# Patient Record
Sex: FEMALE | Race: WHITE
[De-identification: names, ages, dates, MRNs, and addresses within clinical notes are randomized per-mention and may not be internally consistent; named-entity substitution may affect disease eponyms.]

---

## 2017-04-19 NOTE — ER DOCUMENT REPORT
ED General





- General


Stated Complaint: POSSIBLE SEIZURE


Mode of Arrival: Ambulatory


Information source: Patient


Notes: 


66 yr old female hx of hypomagnesemia secondary to absorption issues presents 

with seizure disorder presents with complaints of 2 episodes of seizure 

activity one was 30 seconds second lasting 15 seconds.  Patient only has these 

seizures when her magnesium levels low, patient receives magnesium transfusions 

3 times a week.  Receiving her transfusion when she seized


TRAVEL OUTSIDE OF THE [place] IN LAST 30 DAYS: No





- HPI


Onset: Just prior to arrival


Onset/Duration: Sudden


Quality of pain: No pain


Severity: Mild


Pain Level: Denies


Associated symptoms: Weakness, Other


Exacerbated by: Denies


Relieved by: Denies


Similar symptoms previously: Yes


Recently seen / treated by doctor: Yes





- Related Data


Allergies/Adverse Reactions: 


 





acetaminophen [From Percocet] Allergy (Verified 04/19/17 09:25)


 


butorphanol [From Stadol] Allergy (Verified 04/19/17 09:25)


 


cephalexin [From Keflex] Allergy (Verified 04/19/17 09:25)


 


codeine Allergy (Verified 04/19/17 09:25)


 Difficulty breathing


cyclobenzaprine [From Flexeril] Allergy (Verified 04/19/17 09:25)


 


ketoprofen Allergy (Verified 04/19/17 09:25)


 


ketorolac [From Toradol] Allergy (Verified 04/19/17 09:25)


 


meloxicam [From Mobic] Allergy (Verified 04/19/17 09:25)


 


oxycodone [From Percocet] Allergy (Verified 04/19/17 09:25)


 


pentazocine [From Talwin] Allergy (Verified 04/19/17 09:25)


 


promethazine [From Phenergan] Allergy (Verified 04/19/17 09:25)


 


sulfasalazine [From Azulfidine] Allergy (Verified 04/19/17 09:25)


 


adhesive Adverse Reaction (Verified 04/19/17 09:25)


 











Past Medical History





- Social History


Smoking Status: Never Smoker


Cigarette use (# per day): No


Chew tobacco use (# tins/day): No


Smoking Education Provided: No


Family History: Reviewed & Not Pertinent





Review of Systems





- Review of Systems


Notes: 


REVIEW OF SYSTEMS:


CONSTITUTIONAL :  Denies fever,  chills, or sweats.  Denies recent illness.


EENT:   Denies eye, ear, throat, or mouth pain or symptoms.  Denies nasal or 

sinus congestion or discharge.  Denies throat, tongue, or mouth swelling or 

difficulty swallowing.


CARDIOVASCULAR:  Denies chest pain.  Denies palpitations or racing or irregular 

heart beat.  Denies ankle edema.


RESPIRATORY:  Denies cough, cold, or chest congestion.  Denies shortness of 

breath, difficulty breathing, or wheezing.


GASTROINTESTINAL:  Denies abdominal pain or distention.  Denies nausea, vomiting

, or diarrhea.  Denies blood in vomitus, stools, or per rectum.  Denies black, 

tarry stools.  Denies constipation. 


GENITOURINARY:  Denies difficulty urinating, painful urination, burning, 

frequency, blood in urine, or discharge.


FEMALE  GENITOURINARY:  Denies vaginal bleeding, heavy or abnormal periods, 

irregular periods.  Denies vaginal discharge or odor. 


MUSCULOSKELETAL:  Denies back or neck pain or stiffness.  Denies joint pain or 

swelling.


SKIN:   Denies rash, lesions or sores.


HEMATOLOGIC :   Denies easy bruising or bleeding.


LYMPHATIC:  Denies swollen, enlarged glands.


NEUROLOGICAL: Admits to seizure


PSYCHIATRIC:  Denies anxiety or stress.  Denies depression, suicidal ideation, 

or homicidal ideation.





ALL OTHER SYSTEMS REVIEWED AND NEGATIVE.








Dictation was performed using Dragon voice recognition software 











PHYSICAL EXAMINATION:





GENERAL: Well-appearing, well-nourished and in no acute distress.





HEAD: Atraumatic, normocephalic.





EYES: Pupils equal round and reactive to light, extraocular movements intact, 

conjunctiva are normal.





ENT: Nares patent, oropharynx clear without exudates.  Moist mucous membranes.





NECK: Normal range of motion, supple without lymphadenopathy





LUNGS: Breath sounds clear to auscultation bilaterally and equal.  No wheezes 

rales or rhonchi.





HEART: Regular rate and rhythm without murmurs





ABDOMEN: Soft, nontender, nondistended abdomen.  No guarding, no rebound.  No 

masses appreciated.





Female : deferred





Musculoskeletal: Normal range of motion, no pitting or edema.  No cyanosis.





NEUROLOGICAL: Cranial nerves grossly intact.  Normal speech, normal gait.  

Normal sensory, motor exams





PSYCH: Normal mood, normal affect.





SKIN: Warm, Dry, normal turgor, no rashes or lesions noted.





Physical Exam





- Vital signs


Vitals: 


 











Resp


 


 16 


 


 04/19/17 08:55














Course





- Re-evaluation


Re-evalutation: 


04/19/17 09:07


Patient is currently being infused magnesium, given that this is the cause of 

her previous seizures and I expect any life-threatening issues





04/19/17 11:31


Magnesium level is noted to be significantly low, she is still being transfused 

with no difficulty, mild hypotension is no secondary to the infusion, fluid 

bolus given otherwise patient stable well-appearing





04/19/17 14:10


dilantin level is very low, will give a large dose here and have patient follow 

up with neuroogy for further care 








After performing a Medical Screening Examination, I estimate there is LOW risk 

for INTRACRANIAL HEMORRHAGE, ISCHEMIC CVA, MALIGNANT DYSRHYTHMIA, ACUTE 

CORONARY SYNDROME, MENINGITIS, PULMONARY EMBOLISM, or SEPSIS thus I consider 

the discharge disposition reasonable.  I have reevaluated this patient multiple 

times and no significant life threatening changes are noted. The patient and I 

have discussed the diagnosis and risks, and we agree with discharging home with 

close follow-up with the understanding that symptoms and presentations can 

change. We also discussed returning to the Emergency Department immediately if 

new or worsening symptoms occur. We have discussed the symptoms which are most 

concerning (e.g., changing or worsening pain, weakness, vomiting, fever) that 

necessitate immediate return.





- Vital Signs


Vital signs: 


 











Temp Pulse Resp BP Pulse Ox


 


 98.1 F      11 L  110/64   96 


 


 04/19/17 13:01     04/19/17 13:01  04/19/17 13:01  04/19/17 13:01














- Laboratory


Result Diagrams: 


 04/19/17 09:17





 04/19/17 09:17


Laboratory results interpreted by me: 


 











  04/19/17 04/19/17 04/19/17





  09:17 09:17 11:34


 


RBC  3.17 L  


 


Hgb  9.5 L  


 


Hct  28.7 L  


 


RDW  14.9 H  


 


Chloride   113 H 


 


Carbon Dioxide   18 L 


 


BUN   21 H 


 


Creatinine   1.78 H 


 


Est GFR ( Amer)   35 L 


 


Est GFR (Non-Af Amer)   29 L 


 


Calcium   8.0 L 


 


Magnesium   0.9 L* 


 


Total Protein   5.2 L 


 


Albumin   3.0 L 


 


Phenytoin    < 3.0 L














Critical Care Note





- Critical Care Note


Total time excluding time spent on procedures (mins): 34


Comments: 


34  minutes of critical care time spent in direct contact evaluating and 

reevaluating the patient, treating symptoms, reviewing labs and studies and 

speaking with family  and consultants excluding any procedures





Discharge





- Discharge


Clinical Impression: 


 Seizure disorder, Hypomagnesemia syndrome





Condition: Stable


Disposition: HOME, SELF-CARE


Additional Instructions: 


Seizure, Known Epileptic





     You have had a seizure.  Seizures may "break through" in an epileptic due 

to stress of infection or injury, a change in blood chemistry, or drug and 

alcohol use.  Another common cause is failure to take medication as prescribed.


     Your doctor has evaluated your situation for the likely cause of this 

seizure.  It is important that you follow his advice concerning any medication 

changes and follow-up care.  Further testing of anti-seizure medication levels 

in your blood may be necessary.


     If you have a 's license, it's important that you DO NOT DRIVE until 

given permission by your physician.  This seizure must be reported to the 

's license bureau.


     Call the doctor or return if seizures recur, or if new or unusual symptoms 

arise -- such as severe headache, confusion, excessive sleepiness, local 

weakness or numbness, neck stiffness, or fever.





Referrals: 


GONZALEZ DEE MD [ACTIVE STAFF] - Follow up tomorrow

## 2017-05-14 NOTE — ER DOCUMENT REPORT
ED Medical Screen (RME)





- General


Chief Complaint: Blurred Vision


Stated Complaint: NECK PAIN/SLURRED SPEECH


TRAVEL OUTSIDE OF THE [place] IN LAST 30 DAYS: No





- HPI


Notes: 





05/14/17 17:11


Patient coming in for evaluation of slurred speech blurry vision neck pain 

ongoing for the last 2 days





- Related Data


Allergies/Adverse Reactions: 


 





butorphanol [From Stadol] Allergy (Verified 05/14/17 17:06)


 


cephalexin [From Keflex] Allergy (Verified 05/14/17 17:06)


 


codeine Allergy (Verified 05/14/17 17:06)


 Difficulty breathing


cyclobenzaprine [From Flexeril] Allergy (Verified 05/14/17 17:06)


 


ketoprofen Allergy (Verified 05/14/17 17:06)


 


ketorolac [From Toradol] Allergy (Verified 05/14/17 17:06)


 


meloxicam [From Mobic] Allergy (Verified 05/14/17 17:06)


 


oxycodone [From Percocet] Allergy (Verified 05/14/17 17:06)


 


pentazocine [From Talwin] Allergy (Verified 05/14/17 17:06)


 


promethazine [From Phenergan] Allergy (Verified 05/14/17 17:06)


 


sulfasalazine [From Azulfidine] Allergy (Verified 05/14/17 17:06)


 


adhesive Adverse Reaction (Verified 05/14/17 17:06)


 











Past Medical History


Neurological Medical History: Reports: Hx Seizures


Renal/ Medical History: Reports: Hx Kidney Stones.  Denies: Hx Peritoneal 

Dialysis


Musculoskeltal Medical History: Reports Hx Arthritis


Psychiatric Medical History: Reports: Hx Depression





- Immunizations


Hx Diphtheria, Pertussis, Tetanus Vaccination: No





Review of Systems





- Review of Systems


Constitutional: Other - Neck pain blurry vision neck pain





Physical Exam





- Vital signs


Vitals: 





 











Temp Pulse Resp BP Pulse Ox


 


 97.5 F   88   16   107/57 L  97 


 


 05/14/17 17:02  05/14/17 17:02  05/14/17 17:02  05/14/17 17:02  05/14/17 17:02














- Notes


Notes: 


Equal  strength bilaterally symmetrical smile.  Pupils PERRLA





Course





- Re-evaluation


Re-evalutation: 





05/14/17 17:13


I have greeted and performed a rapid initial assessment of this patient.  A 

comprehensive ED assessment and evaluation of the patient, analysis of test 

results and completion of the medical decision making process will be conducted 

by additional ED providers.





- Vital Signs


Vital signs: 





 











Temp Pulse Resp BP Pulse Ox


 


 97.5 F   88   16   107/57 L  97 


 


 05/14/17 17:02  05/14/17 17:02  05/14/17 17:02  05/14/17 17:02  05/14/17 17:02

## 2017-05-14 NOTE — ER DOCUMENT REPORT
ED General





- General


Chief Complaint: Blurred Vision


Stated Complaint: NECK PAIN/SLURRED SPEECH


Time Seen by Provider: 05/14/17 17:13


Notes: 


Patient is a 66-year-old female with a past medical history of seizures 

secondary to hypomagnesemia, hypertension, hyperlipidemia who presents with 

intermittent double vision, left-sided neck pain, and generalized fatigue.  

States the symptoms have been intermittent over the last 2 days and unchanged 

in frequency since onset.  Nothing improves or worsens her symptoms.  She is 

uncertain if she's had similar symptoms in the past.  Admits to poor by mouth 

intake over the last several days.  She has not seen her primary care doctor 

regarding today's concerns.  She denies any focal weakness, numbness, altered 

mental status, nausea, vomiting, diarrhea, chest pain.  Patient's daughter is 

also concerned about slurred speech but the patient states clearly "it's 

because I'm not wearing dentures silly".


TRAVEL OUTSIDE OF THE [place] IN LAST 30 DAYS: No





- Related Data


Allergies/Adverse Reactions: 


 





butorphanol [From Stadol] Allergy (Verified 05/14/17 17:06)


 


cephalexin [From Keflex] Allergy (Verified 05/14/17 17:06)


 


codeine Allergy (Verified 05/14/17 17:06)


 Difficulty breathing


cyclobenzaprine [From Flexeril] Allergy (Verified 05/14/17 17:06)


 


ketoprofen Allergy (Verified 05/14/17 17:06)


 


ketorolac [From Toradol] Allergy (Verified 05/14/17 17:06)


 


meloxicam [From Mobic] Allergy (Verified 05/14/17 17:06)


 


oxycodone [From Percocet] Allergy (Verified 05/14/17 17:06)


 


pentazocine [From Talwin] Allergy (Verified 05/14/17 17:06)


 


promethazine [From Phenergan] Allergy (Verified 05/14/17 17:06)


 


sulfasalazine [From Azulfidine] Allergy (Verified 05/14/17 17:06)


 


adhesive Adverse Reaction (Verified 05/14/17 17:06)


 











Past Medical History





- General


Information source: Patient, Relative





- Social History


Smoking Status: Never Smoker


Frequency of alcohol use: None


Drug Abuse: None


Lives with: Family


Family History: Reviewed & Not Pertinent


Neurological Medical History: Reports: Hx Seizures


Renal/ Medical History: Reports: Hx Kidney Stones.  Denies: Hx Peritoneal 

Dialysis


Musculoskeltal Medical History: Reports Hx Arthritis


Psychiatric Medical History: Reports: Hx Depression





- Immunizations


Hx Diphtheria, Pertussis, Tetanus Vaccination: No





Review of Systems





- Review of Systems


Notes: 


Constitutional: Negative for fever.


HENT: Negative for sore throat.


Eyes: Positive for visual changes.


Cardiovascular: Negative for chest pain.


Respiratory: Negative for shortness of breath.


Gastrointestinal: Negative for abdominal pain, vomiting or diarrhea.


Genitourinary: Negative for dysuria.


Musculoskeletal: Negative for back pain.


Skin: Negative for rash.


Neurological: Negative for headaches, weakness or numbness.





10 point ROS negative except as marked above and in HPI.





Physical Exam





- Vital signs


Vitals: 


 











Temp Pulse Resp BP Pulse Ox


 


 97.5 F   88   16   107/57 L  97 


 


 05/14/17 17:02  05/14/17 17:02  05/14/17 17:02  05/14/17 17:02  05/14/17 17:02











Interpretation: Normal


Notes: 


PHYSICAL EXAMINATION:





GENERAL: Well-appearing, well-nourished and in no acute distress.





HEAD: Atraumatic, normocephalic.





EYES: Pupils equal round and reactive to light, extraocular movements intact, 

sclera anicteric, conjunctiva are normal.





ENT: nares patent, oropharynx clear without exudates.  Moist mucous membranes.





NECK: Normal range of motion, supple without lymphadenopathy





LUNGS: Breath sounds clear to auscultation bilaterally and equal.  No wheezes 

rales or rhonchi.





HEART: Regular rate and rhythm without murmurs





ABDOMEN: Soft, nontender, normoactive bowel sounds.  No guarding, no rebound.  

No masses appreciated.





EXTREMITIES: Normal range of motion, no pitting or edema.  No cyanosis.





NEUROLOGICAL: Face symmetric.  Tongue protrudes midline.  Extraocular motions 

intact.  Pupils are 2 mm and equally reactive.  Normal speech, normal gait.  5 

out of 5 strength in both the distal and proximal upper and lower extremities 

bilaterally.  Sensation is grossly intact throughout.  Finger to nose testing 

normal.  Pronator drift normal.





PSYCH: Normal mood, normal affect.





SKIN: Warm, Dry, normal turgor, no rashes or lesions noted.





Course





- Re-evaluation


Re-evalutation: 


05/14/17 18:27





Patient presents with multiple vague complaints that did not appear to be 

concerning for any acute life-threatening pathology.  Vitals are within normal 

limits at triage and at time of discharge.  Physical examination is 

unremarkable.  Patient is continuing intermittent "double vision" but has no 

field cut on exam, full extraocular motions.  A complete neuro assessment is 

unremarkable without any focal neurologic deficit, cerebellar deficits, or 

difficulty with ambulation.  She has negative Romberg and pronator drift.  

Speech is clear.  She also is complaining of pain over the left 

sternocleidomastoid muscle and there is point tenderness to palpation.  In 

triage, CT the head and a x-ray of the cervical spine were ordered which were 

noted to be normal.  Patient has tolerated oral intake without difficulty.  

Patient was not noted to be in distress at any point during their ER visit.  At 

this time, based on the reassuring evaluation, I do not suspect an acute MI, 

pulmonary embolus, stroke, aortic dissection, acute intra-abdominal pathology, 

stroke, or sepsis.her labs are consistent with dehydration as she has worsening 

chronic kidney function today relative to her baseline.  I have informed 

patient that she will need to have this rechecked.  So informed them that her 

phenytoin level is undetectable and that she will need to follow closely with 

her primary doctor regarding this issue.  Will discharge with return 

precautions and follow-up recommendations.  Verbal discharge instructions given 

a the bedside and opportunity for questions given. Medication warnings 

reviewed. Patient is in agreement with this plan and has verbalized 

understanding of return precautions and the need for primary care follow-up in 

the next 24-72 hours.











- Vital Signs


Vital signs: 


 











Temp Pulse Resp BP Pulse Ox


 


 97.5 F   88   16   107/57 L  97 


 


 05/14/17 17:02  05/14/17 17:02  05/14/17 17:02  05/14/17 17:02  05/14/17 17:02














- Laboratory


Result Diagrams: 


 05/14/17 19:08





 05/14/17 19:08


Laboratory results interpreted by me: 


 











  05/14/17 05/14/17 05/14/17





  19:08 19:08 19:08


 


RBC  3.08 L  


 


Hgb  9.2 L  


 


Hct  27.3 L  


 


RDW  14.6 H  


 


Sodium   135.4 L 


 


Chloride   108 H 


 


BUN   30 H 


 


Creatinine   2.30 H 


 


Est GFR ( Amer)   26 L 


 


Est GFR (Non-Af Amer)   21 L 


 


Calcium   8.3 L 


 


Alkaline Phosphatase   139 H 


 


Total Protein   4.8 L 


 


Albumin   2.5 L 


 


Phenytoin    < 3.0 L














Discharge





- Discharge


Clinical Impression: 


 Dehydration, Lightheadedness





Condition: Good


Disposition: HOME, SELF-CARE


Additional Instructions: 


Please be sure to drink plenty of fluids.  Your magnesium level is normal 

today.  The CT scan of her head was also normal.  Your kidney function is worse 

today and he needn't have this rechecked by her primary care doctor in the next 

1 week. Please return to the emergency room immediately if you experience any 

concerning symptoms including high fevers, severe headache, chest pain, 

difficulty breathing, abdominal pain, slurred speech, numbness or weakness in 

your arms or legs, or any other symptom that concerns you.


Referrals: 


JAIMIE HERRERA MD [Primary Care Provider] - Follow up in 3-5 days

## 2020-07-24 ENCOUNTER — HOSPITAL ENCOUNTER (OUTPATIENT)
Dept: HOSPITAL 62 - II | Age: 70
Discharge: HOME | End: 2020-07-24
Attending: INTERNAL MEDICINE
Payer: MEDICARE

## 2020-07-24 VITALS — SYSTOLIC BLOOD PRESSURE: 98 MMHG | DIASTOLIC BLOOD PRESSURE: 57 MMHG

## 2020-07-24 DIAGNOSIS — K50.90: ICD-10-CM

## 2020-07-24 DIAGNOSIS — E83.42: ICD-10-CM

## 2020-07-24 DIAGNOSIS — K90.9: Primary | ICD-10-CM

## 2020-07-24 PROCEDURE — 96375 TX/PRO/DX INJ NEW DRUG ADDON: CPT

## 2020-07-24 PROCEDURE — 96365 THER/PROPH/DIAG IV INF INIT: CPT

## 2020-07-24 PROCEDURE — 96366 THER/PROPH/DIAG IV INF ADDON: CPT

## 2020-07-24 RX ADMIN — MAGNESIUM SULFATE IN DEXTROSE SCH MLS/HR: 10 INJECTION, SOLUTION INTRAVENOUS at 13:12

## 2020-07-24 RX ADMIN — MAGNESIUM SULFATE IN DEXTROSE SCH MLS/HR: 10 INJECTION, SOLUTION INTRAVENOUS at 12:12

## 2020-07-28 ENCOUNTER — HOSPITAL ENCOUNTER (OUTPATIENT)
Dept: HOSPITAL 62 - II | Age: 70
Discharge: HOME | End: 2020-07-28
Attending: INTERNAL MEDICINE
Payer: MEDICARE

## 2020-07-28 VITALS — DIASTOLIC BLOOD PRESSURE: 59 MMHG | SYSTOLIC BLOOD PRESSURE: 114 MMHG

## 2020-07-28 DIAGNOSIS — K90.9: Primary | ICD-10-CM

## 2020-07-28 DIAGNOSIS — E83.42: ICD-10-CM

## 2020-07-28 DIAGNOSIS — K50.90: ICD-10-CM

## 2020-07-28 PROCEDURE — 96375 TX/PRO/DX INJ NEW DRUG ADDON: CPT

## 2020-07-28 PROCEDURE — 96366 THER/PROPH/DIAG IV INF ADDON: CPT

## 2020-07-28 PROCEDURE — 96365 THER/PROPH/DIAG IV INF INIT: CPT

## 2020-07-28 RX ADMIN — MAGNESIUM SULFATE IN DEXTROSE SCH MLS/HR: 10 INJECTION, SOLUTION INTRAVENOUS at 08:44

## 2020-07-28 RX ADMIN — MAGNESIUM SULFATE IN DEXTROSE SCH MLS/HR: 10 INJECTION, SOLUTION INTRAVENOUS at 09:55

## 2020-11-19 ENCOUNTER — HOSPITAL ENCOUNTER (OUTPATIENT)
Dept: HOSPITAL 62 - II | Age: 70
Discharge: HOME | End: 2020-11-19
Attending: INTERNAL MEDICINE
Payer: MEDICARE

## 2020-11-19 VITALS — DIASTOLIC BLOOD PRESSURE: 53 MMHG | SYSTOLIC BLOOD PRESSURE: 103 MMHG

## 2020-11-19 DIAGNOSIS — E83.42: ICD-10-CM

## 2020-11-19 DIAGNOSIS — K90.9: Primary | ICD-10-CM

## 2020-11-19 DIAGNOSIS — Z87.19: ICD-10-CM

## 2020-11-19 PROCEDURE — 96365 THER/PROPH/DIAG IV INF INIT: CPT

## 2020-11-19 PROCEDURE — 96366 THER/PROPH/DIAG IV INF ADDON: CPT

## 2020-11-24 ENCOUNTER — HOSPITAL ENCOUNTER (OUTPATIENT)
Dept: HOSPITAL 62 - II | Age: 70
Discharge: HOME | End: 2020-11-24
Attending: INTERNAL MEDICINE
Payer: MEDICARE

## 2020-11-24 VITALS — SYSTOLIC BLOOD PRESSURE: 98 MMHG | DIASTOLIC BLOOD PRESSURE: 60 MMHG

## 2020-11-24 DIAGNOSIS — E83.42: ICD-10-CM

## 2020-11-24 DIAGNOSIS — K90.9: Primary | ICD-10-CM

## 2020-11-24 DIAGNOSIS — Z87.19: ICD-10-CM

## 2020-11-24 PROCEDURE — 96366 THER/PROPH/DIAG IV INF ADDON: CPT

## 2020-11-24 PROCEDURE — 96365 THER/PROPH/DIAG IV INF INIT: CPT

## 2020-11-24 RX ADMIN — MAGNESIUM SULFATE IN DEXTROSE PRN MLS/HR: 10 INJECTION, SOLUTION INTRAVENOUS at 12:42

## 2020-11-24 RX ADMIN — MAGNESIUM SULFATE IN DEXTROSE PRN MLS/HR: 10 INJECTION, SOLUTION INTRAVENOUS at 11:14

## 2020-11-25 ENCOUNTER — HOSPITAL ENCOUNTER (OUTPATIENT)
Dept: HOSPITAL 62 - II | Age: 70
Discharge: HOME | End: 2020-11-25
Attending: INTERNAL MEDICINE
Payer: MEDICARE

## 2020-11-25 VITALS — SYSTOLIC BLOOD PRESSURE: 105 MMHG | DIASTOLIC BLOOD PRESSURE: 56 MMHG

## 2020-11-25 DIAGNOSIS — C34.11: Primary | ICD-10-CM

## 2020-11-25 PROCEDURE — 96366 THER/PROPH/DIAG IV INF ADDON: CPT

## 2020-11-25 PROCEDURE — 96365 THER/PROPH/DIAG IV INF INIT: CPT

## 2020-11-25 RX ADMIN — MAGNESIUM SULFATE IN DEXTROSE SCH MLS/HR: 10 INJECTION, SOLUTION INTRAVENOUS at 09:33

## 2020-11-25 RX ADMIN — MAGNESIUM SULFATE IN DEXTROSE SCH MLS/HR: 10 INJECTION, SOLUTION INTRAVENOUS at 10:36

## 2020-11-27 ENCOUNTER — HOSPITAL ENCOUNTER (OUTPATIENT)
Dept: HOSPITAL 62 - II | Age: 70
Discharge: HOME | End: 2020-11-27
Attending: INTERNAL MEDICINE
Payer: MEDICARE

## 2020-11-27 VITALS — SYSTOLIC BLOOD PRESSURE: 86 MMHG | DIASTOLIC BLOOD PRESSURE: 62 MMHG

## 2020-11-27 DIAGNOSIS — Z87.19: ICD-10-CM

## 2020-11-27 DIAGNOSIS — K90.9: Primary | ICD-10-CM

## 2020-11-27 DIAGNOSIS — E83.42: ICD-10-CM

## 2020-11-27 PROCEDURE — 96366 THER/PROPH/DIAG IV INF ADDON: CPT

## 2020-11-27 PROCEDURE — 96365 THER/PROPH/DIAG IV INF INIT: CPT

## 2020-11-27 RX ADMIN — MAGNESIUM SULFATE IN DEXTROSE SCH MLS/HR: 10 INJECTION, SOLUTION INTRAVENOUS at 11:24

## 2020-11-27 RX ADMIN — MAGNESIUM SULFATE IN DEXTROSE SCH MLS/HR: 10 INJECTION, SOLUTION INTRAVENOUS at 12:24

## 2020-12-01 ENCOUNTER — HOSPITAL ENCOUNTER (OUTPATIENT)
Dept: HOSPITAL 62 - II | Age: 70
Discharge: HOME | End: 2020-12-01
Attending: INTERNAL MEDICINE
Payer: MEDICARE

## 2020-12-01 VITALS — DIASTOLIC BLOOD PRESSURE: 51 MMHG | SYSTOLIC BLOOD PRESSURE: 84 MMHG

## 2020-12-01 DIAGNOSIS — Z87.19: ICD-10-CM

## 2020-12-01 DIAGNOSIS — E83.42: ICD-10-CM

## 2020-12-01 DIAGNOSIS — K90.9: Primary | ICD-10-CM

## 2020-12-01 PROCEDURE — 96366 THER/PROPH/DIAG IV INF ADDON: CPT

## 2020-12-01 PROCEDURE — 96365 THER/PROPH/DIAG IV INF INIT: CPT

## 2020-12-01 RX ADMIN — MAGNESIUM SULFATE IN DEXTROSE PRN MLS/HR: 10 INJECTION, SOLUTION INTRAVENOUS at 11:10

## 2020-12-01 RX ADMIN — MAGNESIUM SULFATE IN DEXTROSE PRN MLS/HR: 10 INJECTION, SOLUTION INTRAVENOUS at 10:09

## 2020-12-02 ENCOUNTER — HOSPITAL ENCOUNTER (OUTPATIENT)
Dept: HOSPITAL 62 - II | Age: 70
Discharge: HOME | End: 2020-12-02
Attending: INTERNAL MEDICINE
Payer: MEDICARE

## 2020-12-02 VITALS — SYSTOLIC BLOOD PRESSURE: 108 MMHG | DIASTOLIC BLOOD PRESSURE: 76 MMHG

## 2020-12-02 DIAGNOSIS — E83.42: ICD-10-CM

## 2020-12-02 DIAGNOSIS — Z87.19: ICD-10-CM

## 2020-12-02 DIAGNOSIS — K90.9: Primary | ICD-10-CM

## 2020-12-02 PROCEDURE — 96366 THER/PROPH/DIAG IV INF ADDON: CPT

## 2020-12-02 PROCEDURE — 96365 THER/PROPH/DIAG IV INF INIT: CPT

## 2020-12-02 RX ADMIN — MAGNESIUM SULFATE IN DEXTROSE PRN MLS/HR: 10 INJECTION, SOLUTION INTRAVENOUS at 12:00

## 2020-12-02 RX ADMIN — MAGNESIUM SULFATE IN DEXTROSE PRN MLS/HR: 10 INJECTION, SOLUTION INTRAVENOUS at 10:56

## 2020-12-04 ENCOUNTER — HOSPITAL ENCOUNTER (OUTPATIENT)
Dept: HOSPITAL 62 - II | Age: 70
Discharge: HOME | End: 2020-12-04
Attending: INTERNAL MEDICINE
Payer: MEDICARE

## 2020-12-04 VITALS — SYSTOLIC BLOOD PRESSURE: 84 MMHG | DIASTOLIC BLOOD PRESSURE: 48 MMHG

## 2020-12-04 DIAGNOSIS — E83.42: ICD-10-CM

## 2020-12-04 DIAGNOSIS — K90.9: Primary | ICD-10-CM

## 2020-12-04 PROCEDURE — 96366 THER/PROPH/DIAG IV INF ADDON: CPT

## 2020-12-04 PROCEDURE — 96375 TX/PRO/DX INJ NEW DRUG ADDON: CPT

## 2020-12-04 PROCEDURE — 96365 THER/PROPH/DIAG IV INF INIT: CPT

## 2020-12-04 RX ADMIN — MAGNESIUM SULFATE IN DEXTROSE PRN MLS/HR: 10 INJECTION, SOLUTION INTRAVENOUS at 10:06

## 2020-12-04 RX ADMIN — MAGNESIUM SULFATE IN DEXTROSE PRN MLS/HR: 10 INJECTION, SOLUTION INTRAVENOUS at 09:04

## 2020-12-08 ENCOUNTER — HOSPITAL ENCOUNTER (OUTPATIENT)
Dept: HOSPITAL 62 - II | Age: 70
Discharge: HOME | End: 2020-12-08
Attending: INTERNAL MEDICINE
Payer: MEDICARE

## 2020-12-08 VITALS — DIASTOLIC BLOOD PRESSURE: 56 MMHG | SYSTOLIC BLOOD PRESSURE: 92 MMHG

## 2020-12-08 DIAGNOSIS — K90.9: Primary | ICD-10-CM

## 2020-12-08 DIAGNOSIS — E83.42: ICD-10-CM

## 2020-12-08 DIAGNOSIS — Z87.19: ICD-10-CM

## 2020-12-08 PROCEDURE — 96366 THER/PROPH/DIAG IV INF ADDON: CPT

## 2020-12-08 PROCEDURE — 96365 THER/PROPH/DIAG IV INF INIT: CPT

## 2020-12-08 PROCEDURE — 96375 TX/PRO/DX INJ NEW DRUG ADDON: CPT

## 2020-12-08 RX ADMIN — MAGNESIUM SULFATE IN DEXTROSE PRN MLS/HR: 10 INJECTION, SOLUTION INTRAVENOUS at 08:25

## 2020-12-08 RX ADMIN — MAGNESIUM SULFATE IN DEXTROSE PRN MLS/HR: 10 INJECTION, SOLUTION INTRAVENOUS at 09:28

## 2020-12-09 ENCOUNTER — HOSPITAL ENCOUNTER (OUTPATIENT)
Dept: HOSPITAL 62 - II | Age: 70
Discharge: HOME | End: 2020-12-09
Attending: INTERNAL MEDICINE
Payer: MEDICARE

## 2020-12-09 VITALS — SYSTOLIC BLOOD PRESSURE: 99 MMHG | DIASTOLIC BLOOD PRESSURE: 59 MMHG

## 2020-12-09 DIAGNOSIS — E83.42: ICD-10-CM

## 2020-12-09 DIAGNOSIS — Z87.19: ICD-10-CM

## 2020-12-09 DIAGNOSIS — K90.9: Primary | ICD-10-CM

## 2020-12-09 PROCEDURE — 96366 THER/PROPH/DIAG IV INF ADDON: CPT

## 2020-12-09 PROCEDURE — 96365 THER/PROPH/DIAG IV INF INIT: CPT

## 2020-12-09 PROCEDURE — 96375 TX/PRO/DX INJ NEW DRUG ADDON: CPT

## 2020-12-09 RX ADMIN — MAGNESIUM SULFATE IN DEXTROSE PRN MLS/HR: 10 INJECTION, SOLUTION INTRAVENOUS at 10:10

## 2020-12-09 RX ADMIN — MAGNESIUM SULFATE IN DEXTROSE PRN MLS/HR: 10 INJECTION, SOLUTION INTRAVENOUS at 11:15

## 2020-12-15 ENCOUNTER — HOSPITAL ENCOUNTER (OUTPATIENT)
Dept: HOSPITAL 62 - II | Age: 70
Discharge: HOME | End: 2020-12-15
Attending: INTERNAL MEDICINE
Payer: MEDICARE

## 2020-12-15 VITALS — DIASTOLIC BLOOD PRESSURE: 66 MMHG | SYSTOLIC BLOOD PRESSURE: 101 MMHG

## 2020-12-15 DIAGNOSIS — E83.42: ICD-10-CM

## 2020-12-15 DIAGNOSIS — K90.9: Primary | ICD-10-CM

## 2020-12-15 DIAGNOSIS — Z87.19: ICD-10-CM

## 2020-12-15 PROCEDURE — 96365 THER/PROPH/DIAG IV INF INIT: CPT

## 2020-12-15 PROCEDURE — 96366 THER/PROPH/DIAG IV INF ADDON: CPT

## 2020-12-15 PROCEDURE — 96375 TX/PRO/DX INJ NEW DRUG ADDON: CPT

## 2020-12-15 RX ADMIN — MAGNESIUM SULFATE IN DEXTROSE PRN MLS/HR: 10 INJECTION, SOLUTION INTRAVENOUS at 08:46

## 2020-12-15 RX ADMIN — MAGNESIUM SULFATE IN DEXTROSE PRN MLS/HR: 10 INJECTION, SOLUTION INTRAVENOUS at 09:50

## 2020-12-16 ENCOUNTER — HOSPITAL ENCOUNTER (OUTPATIENT)
Dept: HOSPITAL 62 - II | Age: 70
Discharge: HOME | End: 2020-12-16
Attending: INTERNAL MEDICINE
Payer: MEDICARE

## 2020-12-16 VITALS — DIASTOLIC BLOOD PRESSURE: 60 MMHG | SYSTOLIC BLOOD PRESSURE: 115 MMHG

## 2020-12-16 DIAGNOSIS — K90.9: Primary | ICD-10-CM

## 2020-12-16 DIAGNOSIS — E83.42: ICD-10-CM

## 2020-12-16 DIAGNOSIS — Z87.19: ICD-10-CM

## 2020-12-16 PROCEDURE — 96366 THER/PROPH/DIAG IV INF ADDON: CPT

## 2020-12-16 PROCEDURE — 96365 THER/PROPH/DIAG IV INF INIT: CPT

## 2020-12-16 RX ADMIN — MAGNESIUM SULFATE IN DEXTROSE PRN MLS/HR: 10 INJECTION, SOLUTION INTRAVENOUS at 09:50

## 2020-12-16 RX ADMIN — MAGNESIUM SULFATE IN DEXTROSE PRN MLS/HR: 10 INJECTION, SOLUTION INTRAVENOUS at 08:48

## 2020-12-18 ENCOUNTER — HOSPITAL ENCOUNTER (OUTPATIENT)
Dept: HOSPITAL 62 - II | Age: 70
Discharge: HOME | End: 2020-12-18
Attending: INTERNAL MEDICINE
Payer: MEDICARE

## 2020-12-18 VITALS — SYSTOLIC BLOOD PRESSURE: 108 MMHG | DIASTOLIC BLOOD PRESSURE: 57 MMHG

## 2020-12-18 DIAGNOSIS — E83.42: ICD-10-CM

## 2020-12-18 DIAGNOSIS — K90.9: Primary | ICD-10-CM

## 2020-12-18 DIAGNOSIS — Z87.19: ICD-10-CM

## 2020-12-18 PROCEDURE — 96365 THER/PROPH/DIAG IV INF INIT: CPT

## 2020-12-18 PROCEDURE — 96366 THER/PROPH/DIAG IV INF ADDON: CPT

## 2020-12-18 RX ADMIN — MAGNESIUM SULFATE IN DEXTROSE PRN MLS/HR: 10 INJECTION, SOLUTION INTRAVENOUS at 09:29

## 2020-12-18 RX ADMIN — MAGNESIUM SULFATE IN DEXTROSE PRN MLS/HR: 10 INJECTION, SOLUTION INTRAVENOUS at 08:24

## 2020-12-22 ENCOUNTER — HOSPITAL ENCOUNTER (OUTPATIENT)
Dept: HOSPITAL 62 - II | Age: 70
Discharge: HOME | End: 2020-12-22
Attending: INTERNAL MEDICINE
Payer: MEDICARE

## 2020-12-22 VITALS — DIASTOLIC BLOOD PRESSURE: 61 MMHG | SYSTOLIC BLOOD PRESSURE: 108 MMHG

## 2020-12-22 DIAGNOSIS — Z87.19: ICD-10-CM

## 2020-12-22 DIAGNOSIS — E83.42: ICD-10-CM

## 2020-12-22 DIAGNOSIS — K90.9: Primary | ICD-10-CM

## 2020-12-22 PROCEDURE — 96375 TX/PRO/DX INJ NEW DRUG ADDON: CPT

## 2020-12-22 PROCEDURE — 96365 THER/PROPH/DIAG IV INF INIT: CPT

## 2020-12-22 PROCEDURE — 96366 THER/PROPH/DIAG IV INF ADDON: CPT

## 2020-12-22 RX ADMIN — MAGNESIUM SULFATE IN DEXTROSE PRN MLS/HR: 10 INJECTION, SOLUTION INTRAVENOUS at 09:39

## 2020-12-22 RX ADMIN — MAGNESIUM SULFATE IN DEXTROSE PRN MLS/HR: 10 INJECTION, SOLUTION INTRAVENOUS at 08:37

## 2020-12-24 ENCOUNTER — HOSPITAL ENCOUNTER (OUTPATIENT)
Dept: HOSPITAL 62 - II | Age: 70
Discharge: HOME | End: 2020-12-24
Attending: INTERNAL MEDICINE
Payer: MEDICARE

## 2020-12-24 VITALS — SYSTOLIC BLOOD PRESSURE: 106 MMHG | DIASTOLIC BLOOD PRESSURE: 68 MMHG

## 2020-12-24 DIAGNOSIS — E83.42: ICD-10-CM

## 2020-12-24 DIAGNOSIS — Z87.19: ICD-10-CM

## 2020-12-24 DIAGNOSIS — K90.9: Primary | ICD-10-CM

## 2020-12-24 PROCEDURE — 96375 TX/PRO/DX INJ NEW DRUG ADDON: CPT

## 2020-12-24 PROCEDURE — 96365 THER/PROPH/DIAG IV INF INIT: CPT

## 2020-12-24 PROCEDURE — 96366 THER/PROPH/DIAG IV INF ADDON: CPT

## 2020-12-24 RX ADMIN — MAGNESIUM SULFATE IN DEXTROSE PRN MLS/HR: 10 INJECTION, SOLUTION INTRAVENOUS at 08:00

## 2020-12-24 RX ADMIN — MAGNESIUM SULFATE IN DEXTROSE PRN MLS/HR: 10 INJECTION, SOLUTION INTRAVENOUS at 09:05

## 2020-12-28 ENCOUNTER — HOSPITAL ENCOUNTER (OUTPATIENT)
Dept: HOSPITAL 62 - ASU | Age: 70
End: 2020-12-28
Attending: INTERNAL MEDICINE
Payer: MEDICARE

## 2020-12-28 VITALS — DIASTOLIC BLOOD PRESSURE: 69 MMHG | SYSTOLIC BLOOD PRESSURE: 112 MMHG

## 2020-12-28 DIAGNOSIS — E83.42: ICD-10-CM

## 2020-12-28 DIAGNOSIS — Z87.19: ICD-10-CM

## 2020-12-28 DIAGNOSIS — K90.9: Primary | ICD-10-CM

## 2020-12-28 PROCEDURE — 96375 TX/PRO/DX INJ NEW DRUG ADDON: CPT

## 2020-12-28 PROCEDURE — 96374 THER/PROPH/DIAG INJ IV PUSH: CPT

## 2020-12-28 PROCEDURE — 96365 THER/PROPH/DIAG IV INF INIT: CPT

## 2020-12-28 PROCEDURE — 96366 THER/PROPH/DIAG IV INF ADDON: CPT

## 2020-12-28 RX ADMIN — MAGNESIUM SULFATE IN DEXTROSE PRN MLS/HR: 10 INJECTION, SOLUTION INTRAVENOUS at 10:40

## 2020-12-28 RX ADMIN — MAGNESIUM SULFATE IN DEXTROSE PRN MLS/HR: 10 INJECTION, SOLUTION INTRAVENOUS at 09:30

## 2020-12-30 ENCOUNTER — HOSPITAL ENCOUNTER (OUTPATIENT)
Dept: HOSPITAL 62 - II | Age: 70
Discharge: HOME | End: 2020-12-30
Attending: INTERNAL MEDICINE
Payer: MEDICARE

## 2020-12-30 VITALS — DIASTOLIC BLOOD PRESSURE: 61 MMHG | SYSTOLIC BLOOD PRESSURE: 106 MMHG

## 2020-12-30 DIAGNOSIS — K90.9: Primary | ICD-10-CM

## 2020-12-30 DIAGNOSIS — K50.90: ICD-10-CM

## 2020-12-30 DIAGNOSIS — E83.42: ICD-10-CM

## 2020-12-30 PROCEDURE — 96375 TX/PRO/DX INJ NEW DRUG ADDON: CPT

## 2020-12-30 PROCEDURE — 96366 THER/PROPH/DIAG IV INF ADDON: CPT

## 2020-12-30 PROCEDURE — 96365 THER/PROPH/DIAG IV INF INIT: CPT

## 2020-12-30 RX ADMIN — MAGNESIUM SULFATE IN DEXTROSE PRN MLS/HR: 10 INJECTION, SOLUTION INTRAVENOUS at 09:13

## 2020-12-30 RX ADMIN — MAGNESIUM SULFATE IN DEXTROSE PRN MLS/HR: 10 INJECTION, SOLUTION INTRAVENOUS at 08:11

## 2020-12-31 ENCOUNTER — HOSPITAL ENCOUNTER (OUTPATIENT)
Dept: HOSPITAL 62 - II | Age: 70
Discharge: HOME | End: 2020-12-31
Attending: INTERNAL MEDICINE
Payer: MEDICARE

## 2020-12-31 VITALS — SYSTOLIC BLOOD PRESSURE: 95 MMHG | DIASTOLIC BLOOD PRESSURE: 58 MMHG

## 2020-12-31 DIAGNOSIS — K50.90: ICD-10-CM

## 2020-12-31 DIAGNOSIS — E83.42: ICD-10-CM

## 2020-12-31 DIAGNOSIS — K90.9: Primary | ICD-10-CM

## 2020-12-31 PROCEDURE — 96366 THER/PROPH/DIAG IV INF ADDON: CPT

## 2020-12-31 PROCEDURE — 96375 TX/PRO/DX INJ NEW DRUG ADDON: CPT

## 2020-12-31 PROCEDURE — 96365 THER/PROPH/DIAG IV INF INIT: CPT

## 2020-12-31 RX ADMIN — MAGNESIUM SULFATE IN DEXTROSE PRN MLS/HR: 10 INJECTION, SOLUTION INTRAVENOUS at 09:25

## 2020-12-31 RX ADMIN — MAGNESIUM SULFATE IN DEXTROSE PRN MLS/HR: 10 INJECTION, SOLUTION INTRAVENOUS at 08:23

## 2021-01-04 ENCOUNTER — HOSPITAL ENCOUNTER (OUTPATIENT)
Dept: HOSPITAL 62 - ASU | Age: 71
End: 2021-01-04
Attending: INTERNAL MEDICINE
Payer: MEDICARE

## 2021-01-04 VITALS — SYSTOLIC BLOOD PRESSURE: 119 MMHG | DIASTOLIC BLOOD PRESSURE: 71 MMHG

## 2021-01-04 DIAGNOSIS — K90.9: Primary | ICD-10-CM

## 2021-01-04 DIAGNOSIS — Z87.19: ICD-10-CM

## 2021-01-04 DIAGNOSIS — E83.42: ICD-10-CM

## 2021-01-04 PROCEDURE — 96375 TX/PRO/DX INJ NEW DRUG ADDON: CPT

## 2021-01-04 PROCEDURE — 96366 THER/PROPH/DIAG IV INF ADDON: CPT

## 2021-01-04 PROCEDURE — 96365 THER/PROPH/DIAG IV INF INIT: CPT

## 2021-01-04 RX ADMIN — MAGNESIUM SULFATE IN DEXTROSE PRN MLS/HR: 10 INJECTION, SOLUTION INTRAVENOUS at 14:04

## 2021-01-04 RX ADMIN — MAGNESIUM SULFATE IN DEXTROSE PRN MLS/HR: 10 INJECTION, SOLUTION INTRAVENOUS at 11:38
